# Patient Record
Sex: FEMALE | Race: ASIAN | URBAN - METROPOLITAN AREA
[De-identification: names, ages, dates, MRNs, and addresses within clinical notes are randomized per-mention and may not be internally consistent; named-entity substitution may affect disease eponyms.]

---

## 2021-07-04 ENCOUNTER — HOSPITAL ENCOUNTER (EMERGENCY)
Age: 26
Discharge: HOME | End: 2021-07-04
Attending: EMERGENCY MEDICINE

## 2021-07-04 VITALS
DIASTOLIC BLOOD PRESSURE: 89 MMHG | HEART RATE: 78 BPM | SYSTOLIC BLOOD PRESSURE: 129 MMHG | OXYGEN SATURATION: 100 % | RESPIRATION RATE: 18 BRPM | TEMPERATURE: 97.6 F

## 2021-07-04 DIAGNOSIS — N93.8 DUB (DYSFUNCTIONAL UTERINE BLEEDING): Primary | ICD-10-CM

## 2021-07-04 LAB
ANION GAP SERPL CALCULATED.3IONS-SCNC: 11 MMOL/L (ref 3–16)
BACTERIA WET PREP: NORMAL
BACTERIA: ABNORMAL /HPF
BASOPHILS ABSOLUTE: 0 K/UL (ref 0–0.2)
BASOPHILS RELATIVE PERCENT: 0.3 %
BILIRUBIN URINE: NEGATIVE
BLOOD, URINE: ABNORMAL
BUN BLDV-MCNC: 9 MG/DL (ref 7–20)
CALCIUM SERPL-MCNC: 9.5 MG/DL (ref 8.3–10.6)
CHLORIDE BLD-SCNC: 105 MMOL/L (ref 99–110)
CLARITY: CLEAR
CLUE CELLS: NORMAL
CO2: 22 MMOL/L (ref 21–32)
COLOR: YELLOW
CREAT SERPL-MCNC: <0.5 MG/DL (ref 0.6–1.1)
EOSINOPHILS ABSOLUTE: 0.1 K/UL (ref 0–0.6)
EOSINOPHILS RELATIVE PERCENT: 1.9 %
EPITHELIAL CELLS WET PREP: NORMAL
EPITHELIAL CELLS, UA: ABNORMAL /HPF (ref 0–5)
GFR AFRICAN AMERICAN: >60
GFR NON-AFRICAN AMERICAN: >60
GLUCOSE BLD-MCNC: 105 MG/DL (ref 70–99)
GLUCOSE URINE: NEGATIVE MG/DL
HCG(URINE) PREGNANCY TEST: NEGATIVE
HCT VFR BLD CALC: 35.6 % (ref 36–48)
HEMOGLOBIN: 12.1 G/DL (ref 12–16)
KETONES, URINE: NEGATIVE MG/DL
LEUKOCYTE ESTERASE, URINE: ABNORMAL
LYMPHOCYTES ABSOLUTE: 2.2 K/UL (ref 1–5.1)
LYMPHOCYTES RELATIVE PERCENT: 32.2 %
MCH RBC QN AUTO: 28.8 PG (ref 26–34)
MCHC RBC AUTO-ENTMCNC: 33.8 G/DL (ref 31–36)
MCV RBC AUTO: 85.2 FL (ref 80–100)
MICROSCOPIC EXAMINATION: YES
MONOCYTES ABSOLUTE: 0.5 K/UL (ref 0–1.3)
MONOCYTES RELATIVE PERCENT: 7.2 %
NEUTROPHILS ABSOLUTE: 4 K/UL (ref 1.7–7.7)
NEUTROPHILS RELATIVE PERCENT: 58.4 %
NITRITE, URINE: NEGATIVE
PDW BLD-RTO: 13.4 % (ref 12.4–15.4)
PH UA: 6 (ref 5–8)
PLATELET # BLD: 264 K/UL (ref 135–450)
PMV BLD AUTO: 8.7 FL (ref 5–10.5)
POTASSIUM SERPL-SCNC: 4.9 MMOL/L (ref 3.5–5.1)
PROTEIN UA: NEGATIVE MG/DL
RBC # BLD: 4.18 M/UL (ref 4–5.2)
RBC UA: ABNORMAL /HPF (ref 0–4)
RBC WET PREP: NORMAL
SODIUM BLD-SCNC: 138 MMOL/L (ref 136–145)
SOURCE WET PREP: NORMAL
SPECIFIC GRAVITY UA: 1.01 (ref 1–1.03)
TRICHOMONAS PREP: NORMAL
URINE TYPE: ABNORMAL
UROBILINOGEN, URINE: 0.2 E.U./DL
WBC # BLD: 6.9 K/UL (ref 4–11)
WBC UA: ABNORMAL /HPF (ref 0–5)
WBC WET PREP: NORMAL
YEAST WET PREP: NORMAL

## 2021-07-04 PROCEDURE — 85025 COMPLETE CBC W/AUTO DIFF WBC: CPT

## 2021-07-04 PROCEDURE — 99283 EMERGENCY DEPT VISIT LOW MDM: CPT

## 2021-07-04 PROCEDURE — 87210 SMEAR WET MOUNT SALINE/INK: CPT

## 2021-07-04 PROCEDURE — 87591 N.GONORRHOEAE DNA AMP PROB: CPT

## 2021-07-04 PROCEDURE — 80048 BASIC METABOLIC PNL TOTAL CA: CPT

## 2021-07-04 PROCEDURE — 87491 CHLMYD TRACH DNA AMP PROBE: CPT

## 2021-07-04 PROCEDURE — 84703 CHORIONIC GONADOTROPIN ASSAY: CPT

## 2021-07-04 PROCEDURE — 81001 URINALYSIS AUTO W/SCOPE: CPT

## 2021-07-04 ASSESSMENT — ENCOUNTER SYMPTOMS
DIARRHEA: 0
RESPIRATORY NEGATIVE: 1
ABDOMINAL PAIN: 1
VOMITING: 0
NAUSEA: 0

## 2021-07-04 NOTE — ED PROVIDER NOTES
ED Attending Attestation Note     Date of evaluation: 7/4/2021    This patient was seen by the JONO. I have seen and examined the patient, agree with the workup, evaluation, management and diagnosis. The care plan has been discussed. I was present for any procedures performed in the JONO's note and have made edits to the note where appropriate. My assessment reveals 26 y.o. female with history of irregular menstrual cycles presenting to the emergency department today for vaginal bleeding. Appears to be having menorrhagia, no pain, saturating 1-2 pads daily. Was previously on OCPs, hemoglobin is stable. Abdomen soft and benign. Pelvic exam performed by the JONO is reassuring. Will provide gynecology referral, recommended restarting her OCP which she does have refills left.          Olinda Eddy MD  07/04/21 2084

## 2021-07-04 NOTE — ED NOTES
Medication she took for vaginal bleeding was vienva, birth control pill.       [address], RN  07/04/21 8151

## 2021-07-04 NOTE — ED PROVIDER NOTES
[facility] [place]  EMERGENCY DEPARTMENT ENCOUNTER          NURSE PRACTITIONER NOTE       Date of evaluation: 7/4/2021    Chief Complaint     Vaginal Bleeding      History of Present Illness     Kkia Deutsch is a 26 y.o. female who presents to the emergency department with a complaint of abnormal vaginal bleeding. Patient states that she started her cycle 2 weeks ago, and has had consistent bleeding with associated clots for the past 2 weeks. States that she is going through 2-3 pads a day. Complains of increased fatigue, denies lightheadedness/dizziness, shortness of breath or chest pain. States that this is happened to her in the past and she had to be \"placed on medication\" to control her bleeding. She has not followed up with gynecology. She does endorse some intermittent cramping in her mid abdomen, denies pelvic pain. Denies other issues at this time, denies recent illnesses. Sexually active with one male partner. Patient speaks broken english; primarily shari speaking; sister in law at bedside helping with translation per patient request.     Review of Systems     Review of Systems   Constitutional: Positive for fatigue. HENT: Negative. Respiratory: Negative. Cardiovascular: Negative. Gastrointestinal: Positive for abdominal pain. Negative for diarrhea, nausea and vomiting. Genitourinary: Positive for menstrual problem and vaginal bleeding. Negative for pelvic pain, vaginal discharge and vaginal pain. Musculoskeletal: Negative. Skin: Negative. Hematological: Does not bruise/bleed easily. Psychiatric/Behavioral: Negative. Past Medical, Surgical, Family, and Social History     She has a past medical history of Hypothyroid. She has no past surgical history on file. Her family history is not on file. She reports that she has never smoked. She has never used smokeless tobacco. She reports previous alcohol use. She reports that she does not use drugs.     Medications There are no discharge medications for this patient. Allergies     She has No Known Allergies. Physical Exam     INITIAL VITALS: BP: 129/89, Temp: 97.6 °F (36.4 °C), Pulse: 78, Resp: 18, SpO2: 100 % Physical Exam  Vitals and nursing note reviewed. Constitutional:       Appearance: Normal appearance. Cardiovascular:      Rate and Rhythm: Normal rate and regular rhythm. Pulses: Normal pulses. Heart sounds: Normal heart sounds. Pulmonary:      Effort: Pulmonary effort is normal. No respiratory distress. Breath sounds: Normal breath sounds. Abdominal:      General: Bowel sounds are normal. There is no distension. Palpations: Abdomen is soft. Tenderness: There is abdominal tenderness (maty-umbilical). Genitourinary:     Comments: No external rashes or lesions, blood in vaginal vault and noted from cervix; clear discharge noted, no CMT or adnexal tenderness, no adnexal fullness noted. Musculoskeletal:         General: Normal range of motion. Skin:     General: Skin is warm and dry. Capillary Refill: Capillary refill takes less than 2 seconds. Neurological:      Mental Status: She is alert and oriented to person, place, and time.    Psychiatric:         Mood and Affect: Mood normal.         Behavior: Behavior normal.           Diagnostic Results       RADIOLOGY:  No orders to display       LABS:   Results for orders placed or performed during the hospital encounter of 07/04/21   C.trachomatis N.gonorrhoeae DNA    Specimen: Cervix   Result Value Ref Range    C. trachomatis DNA Negative Negative    N. gonorrhoeae DNA Negative Negative   Wet prep, genital    Specimen: Vaginal   Result Value Ref Range    Trichomonas Prep None Seen     Yeast, Wet Prep None Seen     Clue Cells, Wet Prep None Seen     WBC, Wet Prep <1+     RBC, Wet Prep 2+     Epi Cells <1+     Bacteria 1+     Source Wet Prep Vaginal    Urinalysis, reflex to microscopic (Lab)   Result Value Ref Range Color, UA Yellow Straw/Yellow    Clarity, UA Clear Clear    Glucose, Ur Negative Negative mg/dL    Bilirubin Urine Negative Negative    Ketones, Urine Negative Negative mg/dL    Specific Gravity, UA 1.010 1.005 - 1.030    Blood, Urine LARGE (A) Negative    pH, UA 6.0 5.0 - 8.0    Protein, UA Negative Negative mg/dL    Urobilinogen, Urine 0.2 <2.0 E.U./dL    Nitrite, Urine Negative Negative    Leukocyte Esterase, Urine TRACE (A) Negative    Microscopic Examination YES     Urine Type NotGiven    hCG, qualitative, pregnancy (Lab)   Result Value Ref Range    HCG(Urine) Pregnancy Test Negative Detects HCG level >20 MIU/mL   CBC auto differential   Result Value Ref Range    WBC 6.9 4.0 - 11.0 K/uL    RBC 4.18 4.00 - 5.20 M/uL    Hemoglobin 12.1 12.0 - 16.0 g/dL    Hematocrit 35.6 (L) 36.0 - 48.0 %    MCV 85.2 80.0 - 100.0 fL    MCH 28.8 26.0 - 34.0 pg    MCHC 33.8 31.0 - 36.0 g/dL    RDW 13.4 12.4 - 15.4 %    Platelets 444 242 - 227 K/uL    MPV 8.7 5.0 - 10.5 fL    Neutrophils % 58.4 %    Lymphocytes % 32.2 %    Monocytes % 7.2 %    Eosinophils % 1.9 %    Basophils % 0.3 %    Neutrophils Absolute 4.0 1.7 - 7.7 K/uL    Lymphocytes Absolute 2.2 1.0 - 5.1 K/uL    Monocytes Absolute 0.5 0.0 - 1.3 K/uL    Eosinophils Absolute 0.1 0.0 - 0.6 K/uL    Basophils Absolute 0.0 0.0 - 0.2 K/uL   Basic Metabolic Panel   Result Value Ref Range    Sodium 138 136 - 145 mmol/L    Potassium 4.9 3.5 - 5.1 mmol/L    Chloride 105 99 - 110 mmol/L    CO2 22 21 - 32 mmol/L    Anion Gap 11 3 - 16    Glucose 105 (H) 70 - 99 mg/dL    BUN 9 7 - 20 mg/dL    CREATININE <0.5 (L) 0.6 - 1.1 mg/dL    GFR Non-African American >60 >60    GFR African American >60 >60    Calcium 9.5 8.3 - 10.6 mg/dL   Microscopic Urinalysis   Result Value Ref Range    WBC, UA 3-5 0 - 5 /HPF    RBC, UA 11-20 (A) 0 - 4 /HPF    Epithelial Cells, UA 0-1 0 - 5 /HPF    Bacteria, UA Rare (A) None Seen /HPF         RECENT VITALS:  BP: 129/89, Temp: 97.6 °F (36.4 °C), Pulse: 78, Resp: 18, SpO2: 100 %         ED Course     Nursing Notes, Past Medical Hx, Past Surgical Hx, Social Hx, Allergies, and Family Hx were reviewed. The patient was given the following medications:  No orders of the defined types were placed in this encounter. CONSULTS:  None    MEDICAL DECISION MAKING / ASSESSMENT / PLAN     Quinn Lassiter is a 26 y.o. female who presents with complaints as noted in HPI. Patient presents with complaint of abnormal vaginal bleeding for the past 2 weeks. States that she is passing clots, and has had heavier bleeding than normal, and a longer cycle than typical.  States that her last cycle was a couple months ago. She is sexually active, denies chance of STDs, denies history of STDs. No known history of ovarian cysts or uterine fibroids. She does have a history of abnormal uterine bleeding a few months ago, at that time states that she was on medication to control the bleeding, did not follow-up with gynecology. On presentation today patient is hemodynamically stable, afebrile, overall very well-appearing. Has mild tenderness to the periumbilical area, no rigidity or guarding, no concerns for an acute abdomen. CBC, MP 1 were done for further evaluation; no anemia noted, no electrolyte abnormalities, no leukocytosis. Urinalysis urine pregnancy unremarkable/pregnancy test negative. Pelvic exam was done, somewhat uncomfortable for the patient as she has not had this done previously, however there was no CMT, no adnexal tenderness, blood noted in vaginal vault with some clear fluid as well, no rashes or lesions. Gonorrhea/chlamydia, and wet prep was obtained. Wet prep unremarkable. Given overall reassuring evaluation, I am not concerned for active infection at this time given lack of tenderness, and I do feel that we can wait for culture results to come back. The patient will be referred to gynecology for ongoing evaluation and management of abnormal uterine bleeding. Encouraged to restart her OCPs as previously prescribed. Patient was given strict return precautions as outlined in the AVS. Patient was agreeable and understanding to this plan of care. This patient was also evaluated by the attending physician. All care plans were discussed and agreed upon. Clinical Impression     1. DUB (dysfunctional uterine bleeding)        Disposition     PATIENT REFERRED TO:  Jeff Vega MD  [address] [place],[address]  [facility]  [phone]      call for follow up appointment for abnormal uterine bleeding      DISCHARGE MEDICATIONS:  There are no discharge medications for this patient.       DISPOSITION  Home in stable condition        ANSHU Batista CNP  07/05/21 4501

## 2021-07-04 NOTE — ED TRIAGE NOTES
Pt arrived to ED with vaginal bleeding for 2 weeks. States this happened back in March and had to take medication to stop bleeding.

## 2021-07-05 LAB
C TRACH DNA GENITAL QL NAA+PROBE: NEGATIVE
N. GONORRHOEAE DNA: NEGATIVE